# Patient Record
Sex: FEMALE | Race: WHITE | HISPANIC OR LATINO | Employment: OTHER | URBAN - METROPOLITAN AREA
[De-identification: names, ages, dates, MRNs, and addresses within clinical notes are randomized per-mention and may not be internally consistent; named-entity substitution may affect disease eponyms.]

---

## 2023-08-04 LAB
ALANINE AMINOTRANSFERASE (SGPT) (U/L) IN SER/PLAS: 30 U/L (ref 7–45)
ALBUMIN (G/DL) IN SER/PLAS: 4.2 G/DL (ref 3.4–5)
ALKALINE PHOSPHATASE (U/L) IN SER/PLAS: 105 U/L (ref 33–110)
ANION GAP IN SER/PLAS: 16 MMOL/L (ref 10–20)
ASPARTATE AMINOTRANSFERASE (SGOT) (U/L) IN SER/PLAS: 35 U/L (ref 9–39)
BASOPHILS (10*3/UL) IN BLOOD BY AUTOMATED COUNT: 0.02 X10E9/L (ref 0–0.1)
BASOPHILS/100 LEUKOCYTES IN BLOOD BY AUTOMATED COUNT: 0.5 % (ref 0–2)
BILIRUBIN TOTAL (MG/DL) IN SER/PLAS: 0.5 MG/DL (ref 0–1.2)
CALCIUM (MG/DL) IN SER/PLAS: 9.3 MG/DL (ref 8.6–10.6)
CARBAMAZEPINE (UG/ML) IN SER/PLAS: 6.6 UG/ML (ref 4–12)
CARBON DIOXIDE, TOTAL (MMOL/L) IN SER/PLAS: 26 MMOL/L (ref 21–32)
CHLORIDE (MMOL/L) IN SER/PLAS: 100 MMOL/L (ref 98–107)
CHOLESTEROL (MG/DL) IN SER/PLAS: 178 MG/DL (ref 0–199)
CHOLESTEROL IN HDL (MG/DL) IN SER/PLAS: 55.5 MG/DL
CHOLESTEROL/HDL RATIO: 3.2
CREATININE (MG/DL) IN SER/PLAS: 0.65 MG/DL (ref 0.5–1.05)
EOSINOPHILS (10*3/UL) IN BLOOD BY AUTOMATED COUNT: 0.07 X10E9/L (ref 0–0.7)
EOSINOPHILS/100 LEUKOCYTES IN BLOOD BY AUTOMATED COUNT: 1.9 % (ref 0–6)
ERYTHROCYTE DISTRIBUTION WIDTH (RATIO) BY AUTOMATED COUNT: 12 % (ref 11.5–14.5)
ERYTHROCYTE MEAN CORPUSCULAR HEMOGLOBIN CONCENTRATION (G/DL) BY AUTOMATED: 32.6 G/DL (ref 32–36)
ERYTHROCYTE MEAN CORPUSCULAR VOLUME (FL) BY AUTOMATED COUNT: 97 FL (ref 80–100)
ERYTHROCYTES (10*6/UL) IN BLOOD BY AUTOMATED COUNT: 4.77 X10E12/L (ref 4–5.2)
GFR FEMALE: >90 ML/MIN/1.73M2
GLUCOSE (MG/DL) IN SER/PLAS: 100 MG/DL (ref 74–99)
HEMATOCRIT (%) IN BLOOD BY AUTOMATED COUNT: 46.3 % (ref 36–46)
HEMOGLOBIN (G/DL) IN BLOOD: 15.1 G/DL (ref 12–16)
IMMATURE GRANULOCYTES/100 LEUKOCYTES IN BLOOD BY AUTOMATED COUNT: 0.3 % (ref 0–0.9)
LDL: 96 MG/DL (ref 0–99)
LEUKOCYTES (10*3/UL) IN BLOOD BY AUTOMATED COUNT: 3.7 X10E9/L (ref 4.4–11.3)
LYMPHOCYTES (10*3/UL) IN BLOOD BY AUTOMATED COUNT: 1.12 X10E9/L (ref 1.2–4.8)
LYMPHOCYTES/100 LEUKOCYTES IN BLOOD BY AUTOMATED COUNT: 30 % (ref 13–44)
MONOCYTES (10*3/UL) IN BLOOD BY AUTOMATED COUNT: 0.34 X10E9/L (ref 0.1–1)
MONOCYTES/100 LEUKOCYTES IN BLOOD BY AUTOMATED COUNT: 9.1 % (ref 2–10)
NEUTROPHILS (10*3/UL) IN BLOOD BY AUTOMATED COUNT: 2.17 X10E9/L (ref 1.2–7.7)
NEUTROPHILS/100 LEUKOCYTES IN BLOOD BY AUTOMATED COUNT: 58.2 % (ref 40–80)
NRBC (PER 100 WBCS) BY AUTOMATED COUNT: 0 /100 WBC (ref 0–0)
PLATELETS (10*3/UL) IN BLOOD AUTOMATED COUNT: 261 X10E9/L (ref 150–450)
POTASSIUM (MMOL/L) IN SER/PLAS: 4.8 MMOL/L (ref 3.5–5.3)
PROTEIN TOTAL: 7.3 G/DL (ref 6.4–8.2)
SODIUM (MMOL/L) IN SER/PLAS: 137 MMOL/L (ref 136–145)
TRIGLYCERIDE (MG/DL) IN SER/PLAS: 134 MG/DL (ref 0–149)
UREA NITROGEN (MG/DL) IN SER/PLAS: 14 MG/DL (ref 6–23)
VLDL: 27 MG/DL (ref 0–40)

## 2023-10-25 DIAGNOSIS — F43.23 ADJUSTMENT DISORDER WITH MIXED ANXIETY AND DEPRESSED MOOD: ICD-10-CM

## 2023-10-26 ENCOUNTER — TELEPHONE (OUTPATIENT)
Dept: BEHAVIORAL HEALTH | Facility: CLINIC | Age: 58
End: 2023-10-26
Payer: MEDICARE

## 2023-10-26 RX ORDER — CARBAMAZEPINE 100 MG/1
CAPSULE, EXTENDED RELEASE ORAL
Qty: 30 CAPSULE | Refills: 1 | Status: SHIPPED | OUTPATIENT
Start: 2023-10-26 | End: 2023-12-11 | Stop reason: ALTCHOICE

## 2023-10-26 RX ORDER — TALC
3 POWDER (GRAM) TOPICAL NIGHTLY
Qty: 3 TABLET | Refills: 1 | Status: SHIPPED | OUTPATIENT
Start: 2023-10-26 | End: 2023-12-11 | Stop reason: SDUPTHER

## 2023-10-26 RX ORDER — FLUOXETINE HYDROCHLORIDE 20 MG/1
20 CAPSULE ORAL
Qty: 3 CAPSULE | Refills: 1 | Status: SHIPPED | OUTPATIENT
Start: 2023-10-26 | End: 2023-12-11 | Stop reason: SDUPTHER

## 2023-10-26 RX ORDER — CARBAMAZEPINE 200 MG/1
1 CAPSULE, EXTENDED RELEASE ORAL NIGHTLY
Qty: 30 CAPSULE | Refills: 1 | Status: SHIPPED | OUTPATIENT
Start: 2023-10-26 | End: 2023-12-11 | Stop reason: ALTCHOICE

## 2023-10-26 NOTE — PROGRESS NOTES
Pharmacy called to clarify if you meant to only send in 3 pills for melatonin and prozac?Thank you.

## 2023-12-08 ENCOUNTER — APPOINTMENT (OUTPATIENT)
Dept: BEHAVIORAL HEALTH | Facility: CLINIC | Age: 58
End: 2023-12-08
Payer: MEDICARE

## 2023-12-11 ENCOUNTER — TELEMEDICINE (OUTPATIENT)
Dept: BEHAVIORAL HEALTH | Facility: CLINIC | Age: 58
End: 2023-12-11
Payer: MEDICARE

## 2023-12-11 VITALS
RESPIRATION RATE: 17 BRPM | SYSTOLIC BLOOD PRESSURE: 124 MMHG | DIASTOLIC BLOOD PRESSURE: 83 MMHG | BODY MASS INDEX: 36.52 KG/M2 | HEIGHT: 60 IN | WEIGHT: 186 LBS | HEART RATE: 67 BPM

## 2023-12-11 DIAGNOSIS — F31.9 BIPOLAR 1 DISORDER (MULTI): Primary | ICD-10-CM

## 2023-12-11 DIAGNOSIS — G47.9 SLEEP DISTURBANCES: ICD-10-CM

## 2023-12-11 DIAGNOSIS — F71 MODERATE INTELLECTUAL DISABILITY WITH INTELLIGENCE QUOTIENT 35 TO 49: ICD-10-CM

## 2023-12-11 DIAGNOSIS — F43.23 ADJUSTMENT DISORDER WITH MIXED ANXIETY AND DEPRESSED MOOD: ICD-10-CM

## 2023-12-11 DIAGNOSIS — F42.2 MIXED OBSESSIONAL THOUGHTS AND ACTS: ICD-10-CM

## 2023-12-11 PROCEDURE — 99213 OFFICE O/P EST LOW 20 MIN: CPT | Performed by: NURSE PRACTITIONER

## 2023-12-11 RX ORDER — TALC
3 POWDER (GRAM) TOPICAL NIGHTLY
Qty: 30 TABLET | Refills: 5 | Status: SHIPPED | OUTPATIENT
Start: 2023-12-11 | End: 2024-03-04 | Stop reason: SDUPTHER

## 2023-12-11 RX ORDER — FLUOXETINE HYDROCHLORIDE 20 MG/1
20 CAPSULE ORAL DAILY
Qty: 30 CAPSULE | Refills: 5 | Status: SHIPPED | OUTPATIENT
Start: 2023-12-11 | End: 2024-03-04 | Stop reason: SDUPTHER

## 2023-12-11 RX ORDER — CARBAMAZEPINE 100 MG/1
100 CAPSULE, EXTENDED RELEASE ORAL EVERY 12 HOURS
Qty: 60 CAPSULE | Refills: 5 | Status: SHIPPED | OUTPATIENT
Start: 2023-12-11 | End: 2024-03-04 | Stop reason: SDUPTHER

## 2023-12-11 NOTE — PROGRESS NOTES
ASSESSMENT: Ms. Santiago presents unchanged with the last set of medication changes. However, she recently had an polyp removed. Therefore, unknown if it was pain related. Staff will continue to monitor to see if there is any improvement.  Reviewed labs. Low white blood cell count.  Will decrease Equetro next appointment. Therefore, earlier appointment given.  See treatment plan below.   **Plasma concentrations and pharmacologic effects of Equetro 100 MG Oral Capsule Extended Release 12 Hour may be increased by FLUoxetine HCl - 20 MG Oral Capsule. Toxicity (nausea, vomiting, diplopia, blurred vision, ataxia) may occur. Toxic serotonin syndrome may also occur.  **Plasma concentrations and therapeutic effects of atorvastatin may be decreased by strong CY inducers (Equetro 100 MG Oral Capsule Extended Release 12 Hour).     PLAN:                                                                                                                         problems treated   f/u requested to prevent relapse   medications renewed/re-ordered     1. Decrease Equetro (Carbamazepine XR) 100 mg by mouth QAM & 100 mg (from 200 mg) by mouth QHS for Bipolar DO. No clinical indication for mental health use.  2. Continue Fluoxetine (Prozac) take 20 mg by mouth daily for OCD.   3.  Continue Melatonin take 3 mg by mouth at bedtime for sleep.   4. Return To Clinic  3 months or earlier if needed. Call [phone] to reschedule.  5. Risks, benefits, alternatives, off-label uses, and side effects of medications have been discussed with patient/caregiver. There is no report of signs/symptoms consistent with medication-induced impairment in daily functioning. At this time, benefits of medication felt to outweigh potential risks. Will continue to reassess need for psychotropic medication at regular 3 to 6 month intervals.   Guardian, Whitley Martinez      Thank you for seeing me today.  If you have any questions or concerns, do not hesitate  to contact my office.  Arcelia Madlonado     TREATMENT TYPE                                                                                                  counseling and coordination of care addressing signs and symptoms of illness; risks/benefits and side effects of medications; and behavioral approaches to illness.  This note was created using electronic dictation. There may be errors in syntax and meaning. Please contact the office with any questions.   For [facility] residents, [employer] is a 24/7 hotline you can call for assistance [[phone]].   Please call 911 or go to your closest Emergency Room if you feel worse. This includes thoughts of hurting yourself or anyone else, or having other troubles such as hearing voices, seeing visions, or having new and scary thoughts about the people around you.      Provider Impressions     PRESENT FOR APPOINTMENT  Client  Caregiver:    Christina Dash, known since Christina has been 6 (16-17 years)     [facility] moved to November 2020 dt open bed. Previously at [facility].     SUBJECTIVE: 58 yo [place] American SF with a history of bipolar disorder, OCD, deaf, nonverbal, and moderate intellectual disability presenting for medication management.   Client not able to use correct American Sign Language. Therefore, not able to utilize  with the [email or web address] system.      Last seen September 2023.  At that time, no medication changes.  May 2023. At that time, melatonin and fluoxetine were increased due to increased OCD.   November 2022. At that time, no medication changes. In interim, see 2/13/23 chart update, hydroxyzine PRN started for [place] trip. DC'd upon return.  May 31, 2022. At that time, no medication changes.  May 9, 2022. At that time, Melatonin was started.  October 2021. At that time, no medication changes.  September 2021. At that time, Equetro (Carbamazepine XR) was decreased.   March 2021. At that time, no medication changes.  December  "2020. At that time, no medication changes.  May 2020. At that time, no medication changes.  February 2020. At that time, no medication changes.   August 2019. At that time, no medication changes.  November 2018. At that time, no medication changes.  August 2018. Lorazepam PRN DC'd dt not approved by [facility].  May 2018. At that time, Lorazepam PRN started for appts. Has not used in interim.  March 2018 for initial evaluation. At that time, no medication changes.      Info collected by caregiver, as ct is deaf and nonverbal. Debbie stands and Waves.  She uses American sign language to this clinician to say thank you.  Still some inappropriate gestures sexually in public.  Ritualistic stepping.  Will wake up in the night for short.  The time, but able to go back to sleep.  Naps some in the day.   increased touching and smelling items. Taking stairs and kicking each step  9/15/23 Endometrial polyp removed at [facility]. Waiting for results.     Strip her clothes, will open the door and screen. She will look back to see if staff are going to stop her. Usually if staff ignore her while she walks naked down the stairs, she will go put her clothes back on.  Attempts to hit certain peer.      Pulm  Appetite: DASH diet. Low sodium. No PICA reported. Discussed ct on Low Sodium diet, but use of Equetro has possible SE of hyponatremia. Levels remain WNL  She gets \"giddy\", excited to go out in public, like shopping. Staff report while they are out, she is constantly touching their sides.  When she is unhappy or bored: strips off her clothes, wears her clothes inside out, shoes on the wrong feet, acts like she is attempting to AWOL, but will keep looking over her shoulder to see if staff are coming.    Continues to participate.      Some laughing to herself and placing her hands in her pants to smell them. Caregiver reports this has always been.      Wears identifying info on bracelet that she wears when in the community.  Coping: " "coloring, walks, car rides  Psy/SI/HI/aggression  May 21, 2018, Debbie engaged in self-injurious behavior scratching herself for unknown reasons. None since. She has had some stripping at the workshop. She will then go in the bathroom and start washing with a damp cloth.   Reports from workshop stated that Debbie does not communicate with staff, but Feb 2018 was using sign language indicating sex.  May touch/rub others' stomach and start laughing. New in the year 2017.  Hx of Aggression: Physical towards female peer or staff: has picked up items to use to hit peer.   Appears short lasting, no more than 30 minutes: if peer looks injured (holding her head) ct walks over and holds her arm out for peer to hit her back.  Will \"look off at something and start laughing.\" No seizure hx. Episodes last a few seconds and occur multiple times a day. Staff report that she likes to look out the window and will see a bird or something. Staff reports that it does not appear to be from internal stimulation.  No hysterical laughing noted when alone. No crying without cause  Collects: Kleenex and paper towels: uses and keeps on her dresser. Caregiver will not discard, but holds a trash can and allow client to throw away. Does not like to get rid of old clothes. She likes to be asked and will give thumbs up to discard. Staff report her room is \"spotless\".   OCD: Debbie will take several steps forward, then a couple steps backwards. She will follow this pattern even on steps. May be mildly bothered by interruption in routine, but does not interfere with the rest of her day.  No MH hospitalizations.        Prior to COVID: Daily schedule: [facility] M-F 9-3:30.   Self-sufficient in all ADL's likes to look \"pretty.\" Wears necklaces, bracelets & matching hair bows.  Med Physicians:  PCP: Dr Sheriff. Has routine appt. no medication changes.   Podiatrist: Dr Hailey Dickinson. Routine appts.  Dietician: [facility]: " "Deborah Ortega. Attends Q 3-6 months.   GYN: Cari Bee NP approx Sept 2017.  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  Menopausal-   ALLERGIES: NKDA     MEDICAL REVIEW OF SYSTEMS:  GEN: denies fever, chills, night sweats  HEENT: denies changes in vision, eye pain, no symptoms of upper respiratory infection. Deaf in both ears  CARDIO: denies chest pain and palpitations   RESP: denies shortness of breath  GI: denies nausea/vomiting/constipation/diarrhea, or blood in the stool  : denies burning/frequency/urgency, or bloody urine  NEURO: denies tremor, dizziness, numbness or tingling  MSK: denies muscle spasms or stiffness  DERM: denies new onset of rash     Med SE: None noted or reported  CHANGE IN TREATMENT: none  COMPLIANCE: good     MMS:  ORIENTATION   alert  ambulatory  cooperative   dysmorphic features     BEHAVIOR:  enters easily  eye contact fair   gait normal  reciprocal interaction  non verbal     MEMORY:  unable to assess     SENSORY :  glasses  Deaf     COMMUNICATION:  No ASL     AFFECT:  normal/full     MOOD: When asked how she feels, rotated her finger in circles on the \"Feelings Chart\"      THOUGHT PROCESS:  concrete   perseverative      THOUGHT Content:  obsessive     CONCENTRATION  normal     FUND OF KNOWLEDGE :  moderate disability     JUDGEMENT: posed situations     EPS None noted or reported  AIMS negative 9/18/23     LABS REVIEWED:  August 2023 in cart  June 2022 in chart  Carbamazepine 1.5 ug/mL (0.8-2.4). While on 100/200 mg  Feb 2021: reviewed:  White Blood Cell Count 3.7 x10E9/L L 4.4 - 11.3      EKG none available      Chief Complaint     An interactive audio and video telecommunication system which permits real time communications between the patient (at the originating site) and provider (at the distant site) was utilized to provide this telehealth service.    Verbal consent was requested and obtained from ADIS VALDES on this date, 09/18/2023 08:45 AM , for a telehealth " visit.   Evaluation of medication for bipolar disorder, OCD                 History of Present IllnessFamily history: - Psychiatric diagnosis: denies primary relatives with serious mental illness (SA) and/or substance use disorders  Parents: both are , prior to ct residing at   Siblings: 3 sisters & 4 brothers, none are in contact with ct  Personal history: unknown  Birth: unknown  Development: unknown  School: unknown  Occupations: [employer]  Sexual: unknown  Abuse: unknown  Orientation: unknown  Marriage/partner: none  Habits: likes routine  Alcohol: none  Tobacco: none  Drugs: none  Prescribed: none  Recreational: LEAP, Softball, line dancing, Volleyball,      Past psychiatric  Previously seen at [facility] from  until 3/23/18.   Had lived with her parents until they passed. She had a short stay in respite until she was connected with provider [facility]. Has resided at  since , in the [facility]. Moved into [facility] 2020. Services still through [facility]. Resides with 3 female peers, ct has her own room.      There are no spiritual/cultural practices/values/needs that are important to know   Initial Fall Risk Screening:   ADIS has not fallen in the last 6 months. The patient is not using an assistive device.    Tobacco Screening: ADIS does not use tobacco.   Blood Pressure monitoring   Blood Pressure Uncontrolled, Systolic >= 140mm Hg   Blood Pressure Controlled, Diastolic 80-89mm Hg   Domestic Violence Screen: Does not feel threatened or abused physically, emotionally or sexually. Do you feel UNSAFE?   The patient feels safe in the home.   Depression/Suicide Screening:   During the past 2 weeks, the patient has not felt down, depressed or hopeless.    During the past 2 weeks, the patient has not felt little interest or pleasure in doing things.    She does not have a risk of suicide.    She has not had thoughts of harming others.     Single alcohol screening question: Patient Declined/Screening not indicated.   Single substance abuse screening question: Patient Declined/Screening not indicated.   Procedure or Sedation Areas:   patient has not had alcohol, recreational drugs, or prescription drugs for non-medical reasons this morning.   Nutrition Screening:   In the past month, there was not a day when I or anyone in my family went hungry because there was not enough food.   Patient Education: The patient or the person with them need(s) extra help because of problems with: learning speaking   The patient is not comfortable filling out medical forms.   Key Learner(s) are identified by the patient as person(s) most likely to participate in providing care, such as managing medications or taking them to doctors' appointments. Relation: Legal Guardian.      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8) (Z72.89)     The patient's household members: [employer]. Has resided at  since 2013, in the [facility].   Employment History: workshop.         Guardian: Whitley Perdomo=[employer].   Living Situation: [facility] 2013 through [facility] services.   Relationship Status: single.   Legal History: none.    history: none.      Allergies  Medication    · No Known  Drug Allergies   Recorded By: Arcelia Galvan; 3/23/2018 10:43:23 AM   Scores and Scales        Facial and Oral Movements:   Muscles of Facial Expression eg. movements of forehead, eyebrows, periorbital area, cheeks; include frowning, blinking, smiling, grimacing: None normal (0).   Lips and Perioral Area eg. puckering, pouting, smacking: None normal (0).   Jaw eg. biting, clenching, chewing, mouth opening, lateral movement: None normal (0).   Tongue. Rate only increases in movement both in and out of mouth, NOT inability to sustain movement None normal (0).   Extremity Movements:   Upper (arms, wrists, hands, fingers). Include chronic movements ( ie, rapid, objectively purposeless, irregular, spontaneous); athetoid movements (ie, slow, irregular, complex, serpentine). DO NOT include tremor (ie, repetitive, regular, rythmic): None normal (0).   Lower (legs, knees, ankles, toes) eg, lateral knee movement, foot tapping, heel dropping, foot squirming, inversion and eversion of foot: None normal (0).   Trunk Movements:   Neck, shoulders, hips. eg, rocking, twisting, squirming, pelvic gyrations: None normal (0).      Overall Severity:   Severity of abnormal movements: None normal (0).   Incapacitation due to abnormal movements: None normal (0).   Patient's awareness of abnormal movements (rate only patient's report): No awareness (0).   Dental Status:   Current porblems with teeth and/or dentures: No.   Does patient usually wear dentures: No.      5/18/2023 clients is deaf and mute. Therefore, not able to complete this self questionnaire. However, caregiver of 16+ years reports no overt signs of depression.         PHQ-9 Depression Scale:   Total Score: 0/27

## 2024-03-04 ENCOUNTER — TELEPHONE (OUTPATIENT)
Dept: BEHAVIORAL HEALTH | Facility: CLINIC | Age: 59
End: 2024-03-04

## 2024-03-04 ENCOUNTER — TELEMEDICINE (OUTPATIENT)
Dept: BEHAVIORAL HEALTH | Facility: CLINIC | Age: 59
End: 2024-03-04
Payer: MEDICARE

## 2024-03-04 VITALS
WEIGHT: 178.5 LBS | DIASTOLIC BLOOD PRESSURE: 60 MMHG | BODY MASS INDEX: 34.86 KG/M2 | HEART RATE: 64 BPM | SYSTOLIC BLOOD PRESSURE: 131 MMHG

## 2024-03-04 DIAGNOSIS — G47.9 SLEEP DISTURBANCES: ICD-10-CM

## 2024-03-04 DIAGNOSIS — F42.2 MIXED OBSESSIONAL THOUGHTS AND ACTS: ICD-10-CM

## 2024-03-04 DIAGNOSIS — F31.9 BIPOLAR 1 DISORDER (MULTI): Primary | ICD-10-CM

## 2024-03-04 PROCEDURE — 1036F TOBACCO NON-USER: CPT | Performed by: NURSE PRACTITIONER

## 2024-03-04 PROCEDURE — 99213 OFFICE O/P EST LOW 20 MIN: CPT | Performed by: NURSE PRACTITIONER

## 2024-03-04 RX ORDER — CARBAMAZEPINE 100 MG/1
CAPSULE, EXTENDED RELEASE ORAL
Qty: 14 CAPSULE | Refills: 0 | Status: SHIPPED | OUTPATIENT
Start: 2024-03-04 | End: 2024-05-20 | Stop reason: ALTCHOICE

## 2024-03-04 RX ORDER — PANTOPRAZOLE SODIUM 20 MG/1
TABLET, DELAYED RELEASE ORAL
COMMUNITY
Start: 2024-02-16

## 2024-03-04 RX ORDER — AMLODIPINE BESYLATE 5 MG/1
TABLET ORAL
COMMUNITY
Start: 2024-02-16

## 2024-03-04 RX ORDER — FLUOXETINE HYDROCHLORIDE 20 MG/1
20 CAPSULE ORAL DAILY
Qty: 30 CAPSULE | Refills: 3 | Status: SHIPPED | OUTPATIENT
Start: 2024-03-04 | End: 2024-05-20 | Stop reason: ALTCHOICE

## 2024-03-04 RX ORDER — TALC
3 POWDER (GRAM) TOPICAL NIGHTLY
Qty: 30 TABLET | Refills: 3 | Status: SHIPPED | OUTPATIENT
Start: 2024-03-04 | End: 2024-05-20 | Stop reason: SDUPTHER

## 2024-03-04 NOTE — PROGRESS NOTES
ASSESSMENT: Ms. Santiago presents unchanged with the last set of medication changes. See treatment plan below.      PLAN:                                                                                                                         problems treated   f/u requested to prevent relapse   medications renewed/re-ordered     1.  Taper off: Equetro (Carbamazepine XR) 100 mg by mouth QAM x 14 days, then discontinue.  Discontinue bedtime dose now.  2. Continue Fluoxetine (Prozac) take 20 mg by mouth daily for OCD.   3.  Continue Melatonin take 3 mg by mouth at bedtime for sleep.   4. Return To Clinic  2-3 months or earlier if needed. Call [phone] to reschedule.  5. Risks, benefits, alternatives, off-label uses, and side effects of medications have been discussed with patient/caregiver. There is no report of signs/symptoms consistent with medication-induced impairment in daily functioning. At this time, benefits of medication felt to outweigh potential risks. Will continue to reassess need for psychotropic medication at regular 3 to 6 month intervals.   Guardian, Whitley Martinez      Thank you for seeing me today.  If you have any questions or concerns, do not hesitate to contact my office.  Arcelia Maldonado     TREATMENT TYPE                                                                                                  counseling and coordination of care addressing signs and symptoms of illness; risks/benefits and side effects of medications; and behavioral approaches to illness.  This note was created using electronic dictation. There may be errors in syntax and meaning. Please contact the office with any questions.   For [facility] residents, [employer] is a 24/7 hotline you can call for assistance [[phone]].   Please call 911 or go to your closest Emergency Room if you feel worse. This includes thoughts of hurting yourself or anyone else, or having other troubles such as hearing voices,  seeing visions, or having new and scary thoughts about the people around you.      Provider Impressions     PRESENT FOR APPOINTMENT  Client  Caregiver:    Christina Dash, known since Christina has been 6 (16-17 years)     [facility] moved to November 2020 dt open bed. Previously at [facility].     SUBJECTIVE: 57 yo [place] American SF with a history of bipolar disorder, OCD, deaf, nonverbal, and moderate intellectual disability presenting for medication management.   Client not able to use correct American Sign Language. Therefore, not able to utilize  with the [employer] system.      Last seen December 2023.  At that time, it EQuetro was decreased due to low white blood cell count.    September 2023.  At that time, no medication changes.  May 2023. At that time, melatonin and fluoxetine were increased due to increased OCD.   November 2022. At that time, no medication changes. In interim, see 2/13/23 chart update, hydroxyzine PRN started for [place] trip. DC'd upon return.  May 31, 2022. At that time, no medication changes.  May 9, 2022. At that time, Melatonin was started.  October 2021. At that time, no medication changes.  September 2021. At that time, Equetro (Carbamazepine XR) was decreased.   March 2021. At that time, no medication changes.  December 2020. At that time, no medication changes.  May 2020. At that time, no medication changes.  February 2020. At that time, no medication changes.   August 2019. At that time, no medication changes.  November 2018. At that time, no medication changes.  August 2018. Lorazepam PRN DC'd dt not approved by [facility].  May 2018. At that time, Lorazepam PRN started for appts. Has not used in interim.  March 2018 for initial evaluation. At that time, no medication changes.      Info collected by caregiver, as ct is deaf and nonverbal. Debbie stands and Waves.  She uses American sign language to this clinician to say thank you.  Still some inappropriate gestures  "sexually in public.  Ritualistic stepping.  Will wake up in the night for short.  The time, but able to go back to sleep.  Naps some in the day.   increased touching and smelling items. Taking stairs and kicking each step  9/15/23 Endometrial polyp removed at [facility]. Waiting for results.     Strip her clothes, will open the door and screen. She will look back to see if staff are going to stop her. Usually if staff ignore her while she walks naked down the stairs, she will go put her clothes back on.  Attempts to hit certain peer.      Pulm  Appetite: DASH diet. Low sodium. No PICA reported. Discussed ct on Low Sodium diet, but use of Equetro has possible SE of hyponatremia. Levels remain WNL  She gets \"giddy\", excited to go out in public, like shopping. Staff report while they are out, she is constantly touching their sides.  When she is unhappy or bored: strips off her clothes, wears her clothes inside out, shoes on the wrong feet, acts like she is attempting to AWOL, but will keep looking over her shoulder to see if staff are coming.    Continues to participate.      Some laughing to herself and placing her hands in her pants to smell them. Caregiver reports this has always been.      Wears identifying info on bracelet that she wears when in the community.  Coping: coloring, walks, car rides  Psy/SI/HI/aggression  May 21, 2018, Debbie engaged in self-injurious behavior scratching herself for unknown reasons. None since. She has had some stripping at the workshop. She will then go in the bathroom and start washing with a damp cloth.   Reports from workshop stated that Debbie does not communicate with staff, but Feb 2018 was using sign language indicating sex.  May touch/rub others' stomach and start laughing. New in the year 2017.  Hx of Aggression: Physical towards female peer or staff: has picked up items to use to hit peer.   Appears short lasting, no more than 30 minutes: if peer looks injured (holding her " "head) ct walks over and holds her arm out for peer to hit her back.  Will \"look off at something and start laughing.\" No seizure hx. Episodes last a few seconds and occur multiple times a day. Staff report that she likes to look out the window and will see a bird or something. Staff reports that it does not appear to be from internal stimulation.  No hysterical laughing noted when alone. No crying without cause  Collects: Kleenex and paper towels: uses and keeps on her dresser. Caregiver will not discard, but holds a trash can and allow client to throw away. Does not like to get rid of old clothes. She likes to be asked and will give thumbs up to discard. Staff report her room is \"spotless\".   OCD: Debbie will take several steps forward, then a couple steps backwards. She will follow this pattern even on steps. May be mildly bothered by interruption in routine, but does not interfere with the rest of her day.  No MH hospitalizations.        Prior to COVID: Daily schedule: [employer] M-F 9-3:30.   Self-sufficient in all ADL's likes to look \"pretty.\" Wears necklaces, bracelets & matching hair bows.  Med Physicians:  PCP: Dr Sheriff. Has routine appt. no medication changes.   Podiatrist: Dr Hailey Dickinson. Routine appts.  Dietician: [facility]: Deborah Ortega. Attends Q 3-6 months.   GYN: Cari Bee NP approx Sept 2017.  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  Menopausal-   ALLERGIES: NKDA     MEDICAL REVIEW OF SYSTEMS:  GEN: denies fever, chills, night sweats  HEENT: denies changes in vision, eye pain, no symptoms of upper respiratory infection. Deaf in both ears  CARDIO: denies chest pain and palpitations   RESP: denies shortness of breath  GI: denies nausea/vomiting/constipation/diarrhea, or blood in the stool  : denies burning/frequency/urgency, or bloody urine  NEURO: denies tremor, dizziness, numbness or tingling  MSK: denies muscle spasms or stiffness  DERM: " "denies new onset of rash     Med SE: None noted or reported    COMPLIANCE: good     MMS:  ORIENTATION   alert  ambulatory  cooperative   dysmorphic features     BEHAVIOR:  enters easily  eye contact fair   gait normal  reciprocal interaction  non verbal     MEMORY:  unable to assess     SENSORY :  glasses  Deaf     COMMUNICATION:  No ASL     AFFECT:  normal/full     MOOD: When asked how she feels, rotated her finger in circles on the \"Feelings Chart\"      THOUGHT PROCESS:  concrete   perseverative      THOUGHT Content:  obsessive     CONCENTRATION  normal     FUND OF KNOWLEDGE :  moderate disability     JUDGEMENT: posed situations     EPS None noted or reported  AIMS negative 23     LABS REVIEWED:  2023 in cart  2022 in chart  Carbamazepine 1.5 ug/mL (0.8-2.4). While on 100/200 mg  2021: reviewed:  White Blood Cell Count 3.7 x10E9/L L 4.4 - 11.3      EKG none available      Chief Complaint     An interactive audio and video telecommunication system which permits real time communications between the patient (at the originating site) and provider (at the distant site) was utilized to provide this telehealth service.    Verbal consent was requested and obtained from ADIS VALDES on this date for a telehealth visit.   Evaluation of medication for bipolar disorder, OCD                 History of Present IllnessFamily history: - Psychiatric diagnosis: denies primary relatives with serious mental illness (SA) and/or substance use disorders  Parents: both are , prior to ct residing at   Siblings: 3 sisters & 4 brothers, none are in contact with ct  Personal history: unknown  Birth: unknown  Development: unknown  School: unknown  Occupations: [employer]  Sexual: unknown  Abuse: unknown  Orientation: unknown  Marriage/partner: none  Habits: likes routine  Alcohol: none  Tobacco: none  Drugs: none  Prescribed: none  Recreational: LEAP, Softball, line dancing, " Dilia,      Past psychiatric  Previously seen at [facility] from 2013 until 3/23/18.   Had lived with her parents until they passed. She had a short stay in respite until she was connected with provider [facility]. Has resided at  since 2013, in the [facility]. Moved into [facility] November 2020. Services still through [facility]. Resides with 3 female peers, ct has her own room.      There are no spiritual/cultural practices/values/needs that are important to know   Initial Fall Risk Screening:   ADIS has not fallen in the last 6 months. The patient is not using an assistive device.    Tobacco Screening: ADIS does not use tobacco.   Blood Pressure monitoring   Blood Pressure Uncontrolled, Systolic >= 140mm Hg   Blood Pressure Controlled, Diastolic 80-89mm Hg   Domestic Violence Screen: Does not feel threatened or abused physically, emotionally or sexually. Do you feel UNSAFE?   The patient feels safe in the home.   Depression/Suicide Screening:   During the past 2 weeks, the patient has not felt down, depressed or hopeless.    During the past 2 weeks, the patient has not felt little interest or pleasure in doing things.    She does not have a risk of suicide.    She has not had thoughts of harming others.    Single alcohol screening question: Patient Declined/Screening not indicated.   Single substance abuse screening question: Patient Declined/Screening not indicated.   Procedure or Sedation Areas:   patient has not had alcohol, recreational drugs, or prescription drugs for non-medical reasons this morning.   Nutrition Screening:   In the past month, there was not a day when I or anyone in my family went hungry because there was not enough food.   Patient Education: The patient or the person with them need(s) extra help because of problems with: learning speaking   The patient is not comfortable filling out medical forms.   Key Learner(s) are identified by the patient as person(s) most  likely to participate in providing care, such as managing medications or taking them to doctors' appointments. Relation: Legal Guardian.      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8) (Z72.89)     The patient's household members: [employer]. Has resided at  since 2013, in the [facility].   Employment History: workshop.         Guardian: Whitley Perdomo=[employer].   Living Situation: [facility] through [facility].   Relationship Status: single.   Legal History: none.    history: none.      Allergies  Medication    · No Known Drug Allergies   Recorded By: Arcelia Galvan; 3/23/2018 10:43:23 AM   Scores and Scales        Facial and Oral Movements:   Muscles of Facial Expression eg. movements of forehead, eyebrows, periorbital area, cheeks; include frowning, blinking, smiling, grimacing: None normal (0).   Lips and Perioral Area eg. puckering, pouting, smacking: None normal (0).   Jaw eg. biting, clenching, chewing, mouth opening, lateral movement: None normal (0).   Tongue. Rate only increases in movement both in and out of mouth, NOT inability to sustain movement None normal (0).   Extremity Movements:   Upper (arms, wrists, hands, fingers). Include chronic movements ( ie, rapid, objectively purposeless,  irregular, spontaneous); athetoid movements (ie, slow, irregular, complex, serpentine). DO NOT include tremor (ie, repetitive, regular, rythmic): None normal (0).   Lower (legs, knees, ankles, toes) eg, lateral knee movement, foot tapping, heel dropping, foot squirming, inversion and eversion of foot: None normal (0).   Trunk Movements:   Neck, shoulders, hips. eg, rocking, twisting, squirming, pelvic gyrations: None normal (0).      Overall Severity:   Severity of abnormal movements: None normal (0).   Incapacitation due to abnormal movements: None normal (0).   Patient's awareness of abnormal movements (rate only patient's report): No awareness (0).   Dental Status:   Current porblems with teeth and/or dentures: No.   Does patient usually wear dentures: No.      5/18/2023 clients is deaf and mute. Therefore, not able to complete this self questionnaire. However, caregiver of 16+ years reports no overt signs of depression.         PHQ-9 Depression Scale:   Total Score: 0/27

## 2024-03-04 NOTE — PATIENT INSTRUCTIONS
ASSESSMENT: Ms. Santiago presents unchanged with the last set of medication changes. See treatment plan below.      PLAN:                                                                                                                         problems treated   f/u requested to prevent relapse   medications renewed/re-ordered     1.  Taper off: Equetro (Carbamazepine XR) 100 mg by mouth QAM x 14 days, then discontinue.  Discontinue bedtime dose now.  2. Continue Fluoxetine (Prozac) take 20 mg by mouth daily for OCD.   3.  Continue Melatonin take 3 mg by mouth at bedtime for sleep.   4. Return To Clinic  2-3 months or earlier if needed. Call [phone] to reschedule.  5. Risks, benefits, alternatives, off-label uses, and side effects of medications have been discussed with patient/caregiver. There is no report of signs/symptoms consistent with medication-induced impairment in daily functioning. At this time, benefits of medication felt to outweigh potential risks. Will continue to reassess need for psychotropic medication at regular 3 to 6 month intervals.   Guardian, Whitley Martinez      Thank you for seeing me today.  If you have any questions or concerns, do not hesitate to contact my office.  Arcelia Maldonado     TREATMENT TYPE                                                                                                  counseling and coordination of care addressing signs and symptoms of illness; risks/benefits and side effects of medications; and behavioral approaches to illness.  This note was created using electronic dictation. There may be errors in syntax and meaning. Please contact the office with any questions.   For [facility] residents, [facility] is a 24/7 hotline you can call for assistance [[phone]].   Please call 911 or go to your closest Emergency Room if you feel worse. This includes thoughts of hurting yourself or anyone else, or having other troubles such as hearing voices,  seeing visions, or having new and scary thoughts about the people around you.

## 2024-05-20 ENCOUNTER — TELEMEDICINE (OUTPATIENT)
Dept: BEHAVIORAL HEALTH | Facility: CLINIC | Age: 59
End: 2024-05-20
Payer: MEDICARE

## 2024-05-20 VITALS
HEART RATE: 61 BPM | BODY MASS INDEX: 33.98 KG/M2 | WEIGHT: 174 LBS | DIASTOLIC BLOOD PRESSURE: 77 MMHG | RESPIRATION RATE: 18 BRPM | SYSTOLIC BLOOD PRESSURE: 127 MMHG

## 2024-05-20 DIAGNOSIS — G47.9 SLEEP DISTURBANCES: ICD-10-CM

## 2024-05-20 DIAGNOSIS — F42.2 MIXED OBSESSIONAL THOUGHTS AND ACTS: Primary | ICD-10-CM

## 2024-05-20 PROCEDURE — 1036F TOBACCO NON-USER: CPT | Performed by: NURSE PRACTITIONER

## 2024-05-20 PROCEDURE — 99213 OFFICE O/P EST LOW 20 MIN: CPT | Performed by: NURSE PRACTITIONER

## 2024-05-20 RX ORDER — FLUOXETINE HYDROCHLORIDE 40 MG/1
40 CAPSULE ORAL DAILY
Qty: 30 CAPSULE | Refills: 3 | Status: SHIPPED | OUTPATIENT
Start: 2024-05-20 | End: 2024-09-17

## 2024-05-20 RX ORDER — TALC
3 POWDER (GRAM) TOPICAL NIGHTLY
Qty: 30 TABLET | Refills: 3 | Status: SHIPPED | OUTPATIENT
Start: 2024-05-20

## 2024-05-20 NOTE — PATIENT INSTRUCTIONS
ASSESSMENT: Ms. Santiago presents with an increase in invasive OCD behaviors.  Monitor if adjustment in medications improve sleep onset.  See treatment plan below.      PLAN:                                                                                                                         problems treated   f/u requested to prevent relapse   medications renewed/re-ordered     1. Increase Fluoxetine (Prozac) 40 mg (from 20 mg) by mouth daily for OCD.   2.  Continue Melatonin take 3 mg by mouth at bedtime for sleep.   3. Risks, benefits, alternatives, off-label uses, and side effects of medications have been discussed with patient/caregiver. There is no report of signs/symptoms consistent with medication-induced impairment in daily functioning. At this time, benefits of medication felt to outweigh potential risks. Will continue to reassess need for psychotropic medication at regular 3 to 6 month intervals.   Guardian, Whitley Martinez   4. Return To Clinic  3 months or earlier if needed. Call [phone] to reschedule.  Thank you for seeing me today.  If you have any questions or concerns, do not hesitate to contact my office.  Arcelia Maldonado

## 2024-05-20 NOTE — PROGRESS NOTES
ASSESSMENT: Ms. Santiago presents with an increase in invasive OCD behaviors.  Monitor if adjustment in medications improved sleep onset.  See treatment plan below.      PLAN:                                                                                                                         problems treated   f/u requested to prevent relapse   medications renewed/re-ordered     1. Increase Fluoxetine (Prozac) 40 mg (from 20 mg) by mouth daily for OCD.   2.  Continue Melatonin take 3 mg by mouth at bedtime for sleep.   3. Risks, benefits, alternatives, off-label uses, and side effects of medications have been discussed with patient/caregiver. There is no report of signs/symptoms consistent with medication-induced impairment in daily functioning. At this time, benefits of medication felt to outweigh potential risks. Will continue to reassess need for psychotropic medication at regular 3 to 6 month intervals.   Guardian, Whitley Juan   4. Return To Clinic  3 months or earlier if needed. Call [phone] to reschedule.  Thank you for seeing me today.  If you have any questions or concerns, do not hesitate to contact my office.  Arcelia Maldonado     TREATMENT TYPE                                                                                                  counseling and coordination of care addressing signs and symptoms of illness; risks/benefits and side effects of medications; and behavioral approaches to illness.  This note was created using electronic dictation. There may be errors in syntax and meaning. Please contact the office with any questions.   For [facility] residents, [employer] is a 24/7 hotline you can call for assistance [[phone]].   Please call 911 or go to your closest Emergency Room if you feel worse. This includes thoughts of hurting yourself or anyone else, or having other troubles such as hearing voices, seeing visions, or having new and scary thoughts about the people  around you.      Provider Impressions     PRESENT FOR APPOINTMENT  Client  Caregiver:    Christina Dash, known since Christina has been 6 (16-17 years)     [facility] moved to November 2020 dt open bed. Previously at [facility].     SUBJECTIVE: 57 yo Lithuanian American SF with a history of bipolar disorder, OCD, deaf, nonverbal, and moderate intellectual disability presenting for medication management.   Client not able to use correct American Sign Language. Therefore, not able to utilize  with the [employer] system.      Last seen March 2023. At that time, Equetro (Carbamazepine XR)was Dc'd.  December 2023.  At that time, it EQuetro was decreased due to low white blood cell count.    September 2023.  At that time, no medication changes.  May 2023. At that time, melatonin and fluoxetine were increased due to increased OCD.   November 2022. At that time, no medication changes. In interim, see 2/13/23 chart update, hydroxyzine PRN started for [place] trip. DC'd upon return.  May 31, 2022. At that time, no medication changes.  May 9, 2022. At that time, Melatonin was started.  October 2021. At that time, no medication changes.  September 2021. At that time, Equetro (Carbamazepine XR) was decreased.   March 2021. At that time, no medication changes.  December 2020. At that time, no medication changes.  May 2020. At that time, no medication changes.  February 2020. At that time, no medication changes.   August 2019. At that time, no medication changes.  November 2018. At that time, no medication changes.  August 2018. Lorazepam PRN DC'd dt not approved by [facility].  May 2018. At that time, Lorazepam PRN started for appts. Has not used in interim.  March 2018 for initial evaluation. At that time, no medication changes.      Info collected by caregiver, as ct is deaf and nonverbal. Debbie stands and Waves.  She uses American sign language to this clinician to say thank you.  Still some inappropriate gestures  "sexually in public.  Ritualistic stepping.  Will wake up in the night for short.  The time, but able to go back to sleep.  Naps some in the day.   increased touching and smelling items. Taking stairs and kicking each step  9/15/23 Endometrial polyp removed at [facility]. Waiting for results.     Strip her clothes, will open the door and screen. She will look back to see if staff are going to stop her. Usually if staff ignore her while she walks naked down the stairs, she will go put her clothes back on.  Attempts to hit certain peer.      Pulm  Appetite: DASH diet. Low sodium. No PICA reported. Discussed ct on Low Sodium diet, but use of Equetro has possible SE of hyponatremia. Levels remain WNL  She gets \"giddy\", excited to go out in public, like shopping. Staff report while they are out, she is constantly touching their sides.  When she is unhappy or bored: strips off her clothes, wears her clothes inside out, shoes on the wrong feet, acts like she is attempting to AWOL, but will keep looking over her shoulder to see if staff are coming.    Continues to participate.      Some laughing to herself and placing her hands in her pants to smell them. Caregiver reports this has always been.      Wears identifying info on bracelet that she wears when in the community.  Coping: coloring, walks, car rides  Psy/SI/HI/aggression  May 21, 2018, Debbie engaged in self-injurious behavior scratching herself for unknown reasons. None since. She has had some stripping at the workshop. She will then go in the bathroom and start washing with a damp cloth.   Reports from workshop stated that Debbie does not communicate with staff, but Feb 2018 was using sign language indicating sex.  May touch/rub others' stomach and start laughing. New in the year 2017.  Hx of Aggression: Physical towards female peer or staff: has picked up items to use to hit peer.   Appears short lasting, no more than 30 minutes: if peer looks injured (holding her " "head) ct walks over and holds her arm out for peer to hit her back.  Will \"look off at something and start laughing.\" No seizure hx. Episodes last a few seconds and occur multiple times a day. Staff report that she likes to look out the window and will see a bird or something. Staff reports that it does not appear to be from internal stimulation.  No hysterical laughing noted when alone. No crying without cause  Collects: Kleenex and paper towels: uses and keeps on her dresser. Caregiver will not discard, but holds a trash can and allow client to throw away. Does not like to get rid of old clothes. She likes to be asked and will give thumbs up to discard. Staff report her room is \"spotless\".   OCD: Debbie will take several steps forward, then a couple steps backwards. She will follow this pattern even on steps. May be mildly bothered by interruption in routine, but does not interfere with the rest of her day.  No MH hospitalizations.        Prior to COVID: Daily schedule: [employer] M-F 9-3:30.   Self-sufficient in all ADL's likes to look \"pretty.\" Wears necklaces, bracelets & matching hair bows.  Med Physicians:  PCP: Dr Sheriff. Has routine appt. no medication changes.   Podiatrist: Dr Hailey Dickinson. Routine appts.  Dietician: [facility]: Deborah Ortega. Attends Q 3-6 months.   GYN: Cari Bee NP approx Sept 2017.  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  Menopausal-   ALLERGIES: NKDA     MEDICAL REVIEW OF SYSTEMS:  GEN: denies fever, chills, night sweats  HEENT: denies changes in vision, eye pain, no symptoms of upper respiratory infection. Deaf in both ears  CARDIO: denies chest pain and palpitations   RESP: denies shortness of breath  GI: denies nausea/vomiting/constipation/diarrhea, or blood in the stool  : denies burning/frequency/urgency, or bloody urine  NEURO: denies tremor, dizziness, numbness or tingling  MSK: denies muscle spasms or stiffness  DERM: " "denies new onset of rash     Med SE: None noted or reported    COMPLIANCE: good     MMS:  ORIENTATION   alert  ambulatory  cooperative   dysmorphic features     BEHAVIOR:  enters easily  eye contact fair   gait normal  reciprocal interaction  non verbal     MEMORY:  unable to assess     SENSORY :  glasses  Deaf     COMMUNICATION:  No ASL     AFFECT:  normal/full     MOOD: When asked how she feels, rotated her finger in circles on the \"Feelings Chart\"      THOUGHT PROCESS:  concrete   perseverative      THOUGHT Content:  obsessive     CONCENTRATION  normal     FUND OF KNOWLEDGE :  moderate disability     JUDGEMENT: posed situations     EPS None noted or reported  AIMS negative      LABS REVIEWED:  2023 in cart  2022 in chart  Carbamazepine 1.5 ug/mL (0.8-2.4). While on 100/200 mg  2021: reviewed:  White Blood Cell Count 3.7 x10E9/L L 4.4 - 11.3      EKG none available      Chief Complaint     An interactive audio and video telecommunication system which permits real time communications between the patient (at the originating site) and provider (at the distant site) was utilized to provide this telehealth service.    Verbal consent was requested and obtained from ADIS VALDES on this date for a telehealth visit.   Evaluation of medication for bipolar disorder, OCD                 History of Present IllnessFamily history: - Psychiatric diagnosis: denies primary relatives with serious mental illness (SA) and/or substance use disorders  Parents: both are , prior to ct residing at   Siblings: 3 sisters & 4 brothers, none are in contact with ct  Personal history: unknown  Birth: unknown  Development: unknown  School: unknown  Occupations: [employer]  Sexual: unknown  Abuse: unknown  Orientation: unknown  Marriage/partner: none  Habits: likes routine  Alcohol: none  Tobacco: none  Drugs: none  Prescribed: none  Recreational: LEAP, Softball, line dancing, Volleyball,    "   Past psychiatric  Previously seen at [facility] from 2013 until 3/23/18.   Had lived with her parents until they passed. She had a short stay in respite until she was connected with provider [facility]. Has resided at  since 2013, in the [facility]. Moved into [facility] November 2020. Services still through [facility]. Resides with 3 female peers, ct has her own room.      There are no spiritual/cultural practices/values/needs that are important to know   Initial Fall Risk Screening:   ADIS has not fallen in the last 6 months. The patient is not using an assistive device.    Tobacco Screening: ADIS does not use tobacco.   Blood Pressure monitoring   Blood Pressure Uncontrolled, Systolic >= 140mm Hg   Blood Pressure Controlled, Diastolic 80-89mm Hg   Domestic Violence Screen: Does not feel threatened or abused physically, emotionally or sexually. Do you feel UNSAFE?   The patient feels safe in the home.   Depression/Suicide Screening:   During the past 2 weeks, the patient has not felt down, depressed or hopeless.    During the past 2 weeks, the patient has not felt little interest or pleasure in doing things.    She does not have a risk of suicide.    She has not had thoughts of harming others.    Single alcohol screening question: Patient Declined/Screening not indicated.   Single substance abuse screening question: Patient Declined/Screening not indicated.   Procedure or Sedation Areas:   patient has not had alcohol, recreational drugs, or prescription drugs for non-medical reasons this morning.   Nutrition Screening:   In the past month, there was not a day when I or anyone in my family went hungry because there was not enough food.   Patient Education: The patient or the person with them need(s) extra help because of problems with: learning speaking   The patient is not comfortable filling out medical forms.   Key Learner(s) are identified by the patient as person(s) most likely to  participate in providing care, such as managing medications or taking them to doctors' appointments. Relation: Legal Guardian.      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8) (Z72.89)     The patient's household members: [employer]. Has resided at  since 2013, in the [facility].   Employment History: workshop.         Guardian: Whitley Perdomo=[email or web address].   Living Situation: [facility] through [facility] services.   Relationship Status: single.   Legal History: none.    history: none.      Allergies  Medication    · No Known Drug Allergies   Recorded By: Arcelia Galvan; 3/23/2018 10:43:23 AM   Scores and Scales        Facial and Oral Movements:   Muscles of Facial Expression eg. movements of forehead, eyebrows, periorbital area, cheeks; include frowning, blinking, smiling, grimacing: None normal (0).   Lips and Perioral Area eg. puckering, pouting, smacking: None normal (0).   Jaw eg. biting, clenching, chewing, mouth opening, lateral movement: None normal (0).   Tongue. Rate only increases in movement both in and out of mouth, NOT inability to sustain movement None normal (0).   Extremity Movements:   Upper (arms, wrists, hands, fingers). Include chronic movements ( ie, rapid, objectively purposeless, irregular,  spontaneous); athetoid movements (ie, slow, irregular, complex, serpentine). DO NOT include tremor (ie, repetitive, regular, rythmic): None normal (0).   Lower (legs, knees, ankles, toes) eg, lateral knee movement, foot tapping, heel dropping, foot squirming, inversion and eversion of foot: None normal (0).   Trunk Movements:   Neck, shoulders, hips. eg, rocking, twisting, squirming, pelvic gyrations: None normal (0).      Overall Severity:   Severity of abnormal movements: None normal (0).   Incapacitation due to abnormal movements: None normal (0).   Patient's awareness of abnormal movements (rate only patient's report): No awareness (0).   Dental Status:   Current porblems with teeth and/or dentures: No.   Does patient usually wear dentures: No.      5/18/2023 clients is deaf and mute. Therefore, not able to complete this self questionnaire. However, caregiver of 16+ years reports no overt signs of depression.         PHQ-9 Depression Scale:   Total Score: 0/27

## 2024-06-04 ENCOUNTER — APPOINTMENT (OUTPATIENT)
Dept: BEHAVIORAL HEALTH | Facility: CLINIC | Age: 59
End: 2024-06-04
Payer: MEDICARE

## 2024-08-19 ENCOUNTER — APPOINTMENT (OUTPATIENT)
Dept: BEHAVIORAL HEALTH | Facility: CLINIC | Age: 59
End: 2024-08-19
Payer: MEDICARE

## 2024-08-19 VITALS
SYSTOLIC BLOOD PRESSURE: 112 MMHG | WEIGHT: 172 LBS | HEART RATE: 68 BPM | HEIGHT: 60 IN | DIASTOLIC BLOOD PRESSURE: 74 MMHG | BODY MASS INDEX: 33.77 KG/M2

## 2024-08-19 DIAGNOSIS — G47.9 SLEEP DISTURBANCES: ICD-10-CM

## 2024-08-19 DIAGNOSIS — F42.2 MIXED OBSESSIONAL THOUGHTS AND ACTS: Primary | ICD-10-CM

## 2024-08-19 PROCEDURE — 3008F BODY MASS INDEX DOCD: CPT | Performed by: NURSE PRACTITIONER

## 2024-08-19 PROCEDURE — 1036F TOBACCO NON-USER: CPT | Performed by: NURSE PRACTITIONER

## 2024-08-19 PROCEDURE — 99214 OFFICE O/P EST MOD 30 MIN: CPT | Performed by: NURSE PRACTITIONER

## 2024-08-19 RX ORDER — FLUOXETINE HYDROCHLORIDE 40 MG/1
80 CAPSULE ORAL DAILY
Qty: 62 CAPSULE | Refills: 5 | Status: SHIPPED | OUTPATIENT
Start: 2024-08-19

## 2024-08-19 RX ORDER — ACETAMINOPHEN 500 MG
5 TABLET ORAL NIGHTLY
Qty: 31 TABLET | Refills: 5 | Status: SHIPPED | OUTPATIENT
Start: 2024-08-19

## 2024-08-19 NOTE — PATIENT INSTRUCTIONS
ASSESSMENT: Ms. Santiago presents unchanged in invasive OCD behaviors with the increase in fluoxetine.   See treatment plan below.      PLAN:                                                                                                                         problems treated   f/u requested to prevent relapse   medications renewed/re-ordered     1. Increase Fluoxetine (Prozac) 80 mg (from 40 mg) by mouth daily for OCD.   2.  Increase Melatonin take 5 mg (from 3 mg) by mouth at bedtime for sleep.   3. Risks, benefits, alternatives, off-label uses, and side effects of medications have been discussed with patient/caregiver. There is no report of signs/symptoms consistent with medication-induced impairment in daily functioning. At this time, benefits of medication felt to outweigh potential risks. Will continue to reassess need for psychotropic medication at regular 3 to 6 month intervals.   Guardian, Whitley Martinez   4. Return To Clinic  Monday 12/9/24 at 8 AM virtual or earlier if needed. Call [phone] to reschedule.  Thank you for seeing me today.  If you have any questions or concerns, do not hesitate to contact my office.  Arcelia Maldonado     TREATMENT TYPE                                                                                                  counseling and coordination of care addressing signs and symptoms of illness; risks/benefits and side effects of medications; and behavioral approaches to illness.  This note was created using electronic dictation. There may be errors in syntax and meaning. Please contact the office with any questions.   For [facility] residents, [employer] is a 24/7 hotline you can call for assistance [[phone]].   Please call 911 or go to your closest Emergency Room if you feel worse. This includes thoughts of hurting yourself or anyone else, or having other troubles such as hearing voices, seeing visions, or having new and scary thoughts about the people  around you.

## 2024-08-19 NOTE — PROGRESS NOTES
ASSESSMENT: Ms. Santiago presents unchanged in invasive OCD behaviors with the increase in fluoxetine.   See treatment plan below.      PLAN:                                                                                                                         problems treated   f/u requested to prevent relapse   medications renewed/re-ordered     1. Increase Fluoxetine (Prozac) 80 mg (from 40 mg) by mouth daily for OCD.   2.  Increase Melatonin take 5 mg (from 3 mg) by mouth at bedtime for sleep.  Chart review shows she was on Ambien in 2010.  3. Risks, benefits, alternatives, off-label uses, and side effects of medications have been discussed with patient/caregiver. There is no report of signs/symptoms consistent with medication-induced impairment in daily functioning. At this time, benefits of medication felt to outweigh potential risks. Will continue to reassess need for psychotropic medication at regular 3 to 6 month intervals.   Guardian, Whitley Martinez   4. Return To Clinic  Monday 12/9/24 at 8 AM virtual or earlier if needed. Call [phone] to reschedule.  Thank you for seeing me today.  If you have any questions or concerns, do not hesitate to contact my office.  Arcelia Maldonado     TREATMENT TYPE                                                                                                  counseling and coordination of care addressing signs and symptoms of illness; risks/benefits and side effects of medications; and behavioral approaches to illness.  This note was created using electronic dictation. There may be errors in syntax and meaning. Please contact the office with any questions.   For [facility] residents, [address] [employer] is a 24/7 hotline you can call for assistance [[phone]].   Please call 911 or go to your closest Emergency Room if you feel worse. This includes thoughts of hurting yourself or anyone else, or having other troubles such as hearing voices, seeing visions, or having  new and scary thoughts about the people around you.      Provider Impressions     PRESENT FOR APPOINTMENT  Client  Caregiver:    Christina Dash, known since Christina has been 5 (16-17 years)     [facility] moved to November 2020 dt open bed. Previously at [facility].     SUBJECTIVE: 57 yo Malaysian American SF with a history of bipolar disorder, OCD, deaf, nonverbal, and moderate intellectual disability presenting for medication management.   Client not able to use correct American Sign Language. Therefore, not able to utilize  with the [employer] system.      Last seen May 2024. At that time, fluoxetine was increased for OCD.  March 2023. At that time, Equetro (Carbamazepine XR)was Dc'd.  December 2023.  At that time, Equetro was decreased due to low white blood cell count.    September 2023.  At that time, no medication changes.  May 2023. At that time, melatonin and fluoxetine were increased due to increased OCD.   November 2022. At that time, no medication changes. In interim, see 2/13/23 chart update, hydroxyzine PRN started for [place] trip. DC'd upon return.  May 31, 2022. At that time, no medication changes.  May 9, 2022. At that time, Melatonin was started.  October 2021. At that time, no medication changes.  September 2021. At that time, Equetro (Carbamazepine XR) was decreased.   March 2021. At that time, no medication changes.  December 2020. At that time, no medication changes.  May 2020. At that time, no medication changes.  February 2020. At that time, no medication changes.   August 2019. At that time, no medication changes.  November 2018. At that time, no medication changes.  August 2018. Lorazepam PRN DC'd dt not approved by [employer].  May 2018. At that time, Lorazepam PRN started for appts. Has not used in interim.  March 2018 for initial evaluation. At that time, no medication changes.      Info collected by caregiver, as ct is deaf and nonverbal. Debbie stands and Waves.  She uses  "American sign language to this clinician.  No changes reported and OCD behaviors.  Caregiver reports does not appear to be different than when she was on Equetro.  Although, it does appear to have increased over the last year.  Chart review showed that in 2010 she was on Ambien for sleep.  Still some inappropriate gestures sexually in public.  Ritualistic stepping.  Will wake up in the night for short.  The time, but able to go back to sleep.  Naps some in the day.   increased touching and smelling items. Taking stairs and kicking each step  9/15/23 Endometrial polyp removed at [facility].    Strip her clothes, will open the door and screen. She will look back to see if staff are going to stop her. Usually if staff ignore her while she walks naked down the stairs, she will go put her clothes back on.  Attempts to hit certain peer.      Pulm  Appetite: DASH diet. Low sodium. No PICA reported. Discussed ct on Low Sodium diet, but use of Equetro has possible SE of hyponatremia. Levels remain WNL  She gets \"giddy\", excited to go out in public, like shopping. Staff report while they are out, she is constantly touching their sides.  When she is unhappy or bored: strips off her clothes, wears her clothes inside out, shoes on the wrong feet, acts like she is attempting to AWOL, but will keep looking over her shoulder to see if staff are coming.    Continues to participate.      Some laughing to herself and placing her hands in her pants to smell them. Caregiver reports this has always been.      Wears identifying info on bracelet that she wears when in the community.  Coping: coloring, walks, car rides  Psy/SI/HI/aggression  May 21, 2018, Debbie engaged in self-injurious behavior scratching herself for unknown reasons. None since. She has had some stripping at the workshop. She will then go in the bathroom and start washing with a damp cloth.   Reports from workshop stated that Debbie does not communicate with staff, but Feb " "2018 was using sign language indicating sex.  May touch/rub others' stomach and start laughing. New in the year 2017.  Hx of Aggression: Physical towards female peer or staff: has picked up items to use to hit peer.   Appears short lasting, no more than 30 minutes: if peer looks injured (holding her head) ct walks over and holds her arm out for peer to hit her back.  Will \"look off at something and start laughing.\" No seizure hx. Episodes last a few seconds and occur multiple times a day. Staff report that she likes to look out the window and will see a bird or something. Staff reports that it does not appear to be from internal stimulation.  No hysterical laughing noted when alone. No crying without cause  Collects: Kleenex and paper towels: uses and keeps on her dresser. Caregiver will not discard, but holds a trash can and allow client to throw away. Does not like to get rid of old clothes. She likes to be asked and will give thumbs up to discard. Staff report her room is \"spotless\".   OCD: Debbie will take several steps forward, then a couple steps backwards. She will follow this pattern even on steps. May be mildly bothered by interruption in routine, but does not interfere with the rest of her day.  No MH hospitalizations.        Prior to COVID: Daily schedule: [employer] [facility] M-F 9-3:30.   Self-sufficient in all ADL's likes to look \"pretty.\" Wears necklaces, bracelets & matching hair bows.  Med Physicians:  PCP: Dr Sheriff. Has routine appt. no medication changes.   Podiatrist: Dr Hailey Dickinson. Routine appts.  Dietician: [facility]: Deborah Ortega. Attends Q 3-6 months.   GYN: Cari Bee NP approx Sept 2017.  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  Menopausal-   ALLERGIES: NKDA     Review of symptoms reveals  CVS: no lower limb swelling and no weight gain  she does have intermittent chest pain often after she eats spicy food and nthis resolves after ginger ale  no " "PND or orthopnea  Resp: no breathlessness  GI: nil change on bowel or bladder function    ACTIVE PROBLEM LIST  Rubella Exposure  S/P Pda Repair  Cerebral Palsy  Speech Defect  Intellectual Disability  Microcephaly  Hearing Difficulty       Med SE: None noted or reported    COMPLIANCE: good     MMS:  ORIENTATION   alert  ambulatory  cooperative   dysmorphic features     BEHAVIOR:  enters easily  eye contact fair   gait normal  reciprocal interaction  non verbal     MEMORY:  unable to assess     SENSORY :  glasses  Deaf     COMMUNICATION:  No ASL     AFFECT:  normal/full     MOOD: When asked how she feels, rotated her finger in circles on the \"Feelings Chart\"      THOUGHT PROCESS:  concrete   perseverative      THOUGHT Content:  obsessive     CONCENTRATION  normal     FUND OF KNOWLEDGE :  moderate disability     JUDGEMENT: posed situations     EPS None noted or reported  AIMS negative N/A     LABS REVIEWED:  2023 in cart  2022 in chart  Carbamazepine 1.5 ug/mL (0.8-2.4). While on 100/200 mg  2021: reviewed:  White Blood Cell Count 3.7 x10E9/L L 4.4 - 11.3      EKG none available      Chief Complaint     An interactive audio and video telecommunication system which permits real time communications between the patient (at the originating site) and provider (at the distant site) was utilized to provide this telehealth service.    Verbal consent was requested and obtained from ADIS LUCIO on this date for a telehealth visit.   Evaluation of medication for bipolar disorder, OCD        History of Present IllnessFamily history: - Psychiatric diagnosis: denies primary relatives with serious mental illness (SA) and/or substance use disorders  Parents: both are , prior to ct residing at   Siblings: 3 sisters & 4 brothers, none are in contact with ct  Personal history: unknown  Birth: unknown  Development: unknown  School: unknown  Occupations: [employer]  Sexual: " unknown  Abuse: unknown  Orientation: unknown  Marriage/partner: none  Habits: likes routine  Alcohol: none  Tobacco: none  Drugs: none  Prescribed: none  Recreational: LEAP, Softball, line dancing, Volleyball,      Past psychiatric  Previously seen at [facility] from 2013 until 3/23/18.   Had lived with her parents until they passed. She had a short stay in respite until she was connected with provider [facility]. Has resided at  since 2013, in the [facility]. Moved into [facility] November 2020. Services still through [facility]. Resides with 3 female peers, ct has her own room.      There are no spiritual/cultural practices/values/needs that are important to know   Initial Fall Risk Screening:   ADIS has not fallen in the last 6 months. The patient is not using an assistive device.    Tobacco Screening: ADIS does not use tobacco.   Blood Pressure monitoring   Blood Pressure Uncontrolled, Systolic >= 140mm Hg   Blood Pressure Controlled, Diastolic 80-89mm Hg   Domestic Violence Screen: Does not feel threatened or abused physically, emotionally or sexually. Do you feel UNSAFE?   The patient feels safe in the home.   Depression/Suicide Screening:   During the past 2 weeks, the patient has not felt down, depressed or hopeless.    During the past 2 weeks, the patient has not felt little interest or pleasure in doing things.    She does not have a risk of suicide.    She has not had thoughts of harming others.    Single alcohol screening question: Patient Declined/Screening not indicated.   Single substance abuse screening question: Patient Declined/Screening not indicated.   Procedure or Sedation Areas:   patient has not had alcohol, recreational drugs, or prescription drugs for non-medical reasons this morning.   Nutrition Screening:   In the past month, there was not a day when I or anyone in my family went hungry because there was not enough food.   Patient Education: The patient or the person  with them need(s) extra help because of problems with: learning speaking   The patient is not comfortable filling out medical forms.   Key Learner(s) are identified by the patient as person(s) most likely to participate in providing care, such as managing medications or taking them to doctors' appointments. Relation: Legal Guardian.      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8) (Z72.89)     The patient's [facility]. Has resided at  since 2013, in the [facility].   Employment History: workshop.         Guardian: Whitley Perdomo=APSI.   Living Situation: [facility] 2013 through [facility] services.   Relationship Status: single.   Legal History: none.    history: none.      5/18/2023 clients is deaf and mute. Therefore, not able to complete this self questionnaire. However, caregiver of 16+ years reports no overt signs of depression.         PHQ-9 Depression Scale:   Total Score: 0/27

## 2024-12-09 ENCOUNTER — APPOINTMENT (OUTPATIENT)
Dept: BEHAVIORAL HEALTH | Facility: CLINIC | Age: 59
End: 2024-12-09
Payer: MEDICARE

## 2024-12-18 ENCOUNTER — APPOINTMENT (OUTPATIENT)
Dept: BEHAVIORAL HEALTH | Facility: CLINIC | Age: 59
End: 2024-12-18
Payer: MEDICARE

## 2025-01-22 DIAGNOSIS — F42.2 MIXED OBSESSIONAL THOUGHTS AND ACTS: ICD-10-CM

## 2025-01-22 DIAGNOSIS — G47.9 SLEEP DISTURBANCES: ICD-10-CM

## 2025-01-23 RX ORDER — FLUOXETINE HYDROCHLORIDE 40 MG/1
80 CAPSULE ORAL DAILY
Qty: 62 CAPSULE | Refills: 0 | Status: SHIPPED | OUTPATIENT
Start: 2025-01-23

## 2025-01-23 RX ORDER — ACETAMINOPHEN 500 MG
5 TABLET ORAL NIGHTLY
Qty: 31 TABLET | Refills: 0 | Status: SHIPPED | OUTPATIENT
Start: 2025-01-23

## 2025-02-10 ENCOUNTER — APPOINTMENT (OUTPATIENT)
Dept: BEHAVIORAL HEALTH | Facility: CLINIC | Age: 60
End: 2025-02-10
Payer: MEDICARE

## 2025-02-12 DIAGNOSIS — F42.2 MIXED OBSESSIONAL THOUGHTS AND ACTS: ICD-10-CM

## 2025-02-12 RX ORDER — FLUOXETINE HYDROCHLORIDE 40 MG/1
80 CAPSULE ORAL DAILY
Qty: 62 CAPSULE | Refills: 0 | Status: SHIPPED | OUTPATIENT
Start: 2025-02-12 | End: 2025-02-13 | Stop reason: SDUPTHER

## 2025-02-13 ENCOUNTER — OFFICE VISIT (OUTPATIENT)
Dept: BEHAVIORAL HEALTH | Facility: CLINIC | Age: 60
End: 2025-02-13
Payer: MEDICARE

## 2025-02-13 VITALS
WEIGHT: 163 LBS | BODY MASS INDEX: 32 KG/M2 | RESPIRATION RATE: 19 BRPM | HEART RATE: 81 BPM | HEIGHT: 60 IN | DIASTOLIC BLOOD PRESSURE: 68 MMHG | SYSTOLIC BLOOD PRESSURE: 117 MMHG

## 2025-02-13 DIAGNOSIS — F71 MODERATE INTELLECTUAL DISABILITY WITH INTELLIGENCE QUOTIENT 35 TO 49: ICD-10-CM

## 2025-02-13 DIAGNOSIS — G47.9 SLEEP DISTURBANCES: ICD-10-CM

## 2025-02-13 DIAGNOSIS — F42.2 MIXED OBSESSIONAL THOUGHTS AND ACTS: Primary | ICD-10-CM

## 2025-02-13 PROCEDURE — 1036F TOBACCO NON-USER: CPT | Performed by: NURSE PRACTITIONER

## 2025-02-13 PROCEDURE — 99215 OFFICE O/P EST HI 40 MIN: CPT | Performed by: NURSE PRACTITIONER

## 2025-02-13 PROCEDURE — 3008F BODY MASS INDEX DOCD: CPT | Performed by: NURSE PRACTITIONER

## 2025-02-13 RX ORDER — ACETAMINOPHEN 500 MG
5 TABLET ORAL NIGHTLY
Qty: 31 TABLET | Refills: 5 | Status: SHIPPED | OUTPATIENT
Start: 2025-02-13

## 2025-02-13 RX ORDER — FLUOXETINE HYDROCHLORIDE 40 MG/1
80 CAPSULE ORAL DAILY
Qty: 62 CAPSULE | Refills: 5 | Status: SHIPPED | OUTPATIENT
Start: 2025-02-13

## 2025-02-13 NOTE — PROGRESS NOTES
ASSESSMENT: Ms. Santiago presents at baseline mental health wise.  No new health issues.  Consider fasting labs next appointment.  Discussed chemical imbalance versus behavioral.  No changes in symptoms on/off nor higher/lower doses.  See treatment plan below.      PLAN:                                                                                                                         problems treated   f/u requested to prevent relapse   medications renewed/re-ordered     1. Continue Fluoxetine (Prozac) 80 mg by mouth daily for OCD.   2. Continue Melatonin take 5 mg by mouth at bedtime for sleep.    3. Risks, benefits, alternatives, off-label uses, and side effects of medications have been discussed with patient/caregiver. There is no report of signs/symptoms consistent with medication-induced impairment in daily functioning. At this time, benefits of medication felt to outweigh potential risks. Will continue to reassess need for psychotropic medication at regular 3 to 6 month intervals.   Guardian, Whitley Martinez   4. Return To Clinic 6 months virtual or earlier if needed. Call [phone] to reschedule.  Thank you for seeing me today.  If you have any questions or concerns, do not hesitate to contact my office.  Arcelia Maldonado  Total time: 41 minutes  TREATMENT TYPE                                                                                                  counseling and coordination of care addressing signs and symptoms of illness; risks/benefits and side effects of medications; and behavioral approaches to illness.  This note was created using electronic dictation. There may be errors in syntax and meaning. Please contact the office with any questions.   For [facility] residents, [employer] is a 24/7 hotline you can call for assistance [[phone]].   Please call 911 or go to your closest Emergency Room if you feel worse. This includes thoughts of hurting yourself or anyone else, or having  other troubles such as hearing voices, seeing visions, or having new and scary thoughts about the people around you.      Provider Impressions     PRESENT FOR APPOINTMENT  Client  Caregiver:    Christina Dash, known since Christina has been 6 (16-17 years)     [facility] moved to November 2020 dt open bed. Previously at [facility].     SUBJECTIVE: 58 yo [place] American SF with a history of bipolar disorder, OCD, deaf, nonverbal, and moderate intellectual disability presenting for medication management.   Client not able to use correct American Sign Language. Therefore, not able to utilize  with the [employer] system.      Last seen August 2024.  At that time, fluoxetine and melatonin were increased.  May 2024. At that time, fluoxetine was increased for OCD.  March 2023. At that time, Equetro (Carbamazepine XR)was Dc'd.  December 2023.  At that time, Equetro was decreased due to low white blood cell count.    September 2023.  At that time, no medication changes.  May 2023. At that time, melatonin and fluoxetine were increased due to increased OCD.   November 2022. At that time, no medication changes. In interim, see 2/13/23 chart update, hydroxyzine PRN started for [place] trip. DC'd upon return.  May 31, 2022. At that time, no medication changes.  May 9, 2022. At that time, Melatonin was started.  October 2021. At that time, no medication changes.  September 2021. At that time, Equetro (Carbamazepine XR) was decreased.   March 2021. At that time, no medication changes.  December 2020. At that time, no medication changes.  May 2020. At that time, no medication changes.  February 2020. At that time, no medication changes.   August 2019. At that time, no medication changes.  November 2018. At that time, no medication changes.  August 2018. Lorazepam PRN DC'd dt not approved by C.  May 2018. At that time, Lorazepam PRN started for appts. Has not used in interim.  March 2018 for initial evaluation. At  "that time, no medication changes.      Info collected by caregiver, as ct is deaf and nonverbal. She uses American sign language to this clinician.  Debbie stands and walks over to the camera and caregiver to show that she is wearing her shoes on different feet.  Caregiver reports that she will slowly start doing things that she knows how to typically do in order to receive attention.  As caregiver predicted, when issues did not elicit a response, she returned with her winter coat on backwards.  Caregiver replied that is not the end, she will come back again.  Which, she did with her hands in the back of her pants.  Caregiver replied if they let her continue, the next step would be for her to return without clothing.  Caregiver that has known her upwards of 20 years, reports no changes on or off meds.  Although may be slightly better with no longer taking Equetro (carbamazepine ER).  Chart review showed that in 2010 she was on Ambien for sleep.  However, no improvements in sleep reported at that time.  OCD history: Inappropriate gestures sexually in public.  Ritualistic stepping, Taking stairs and kicking each step.  touching and smelling items, including hands in the back of her pants.  Strip her clothes, will open the door and screen. She will look back to see if staff are going to stop her. Usually if staff ignore her while she walks naked down the stairs, she will go put her clothes back on.  She will raise her fist caregivers, but no attempts to actually hit.  However, she will attend to hit a certain peer.  No known trigger.  Happens rarely.  Sleep: Will wake up in the night for short time, but able to go back to sleep. Naps some in the day when there are no activities.   January 2024 [facility] at [facility].  No note in epic.  9/15/23 Endometrial polyp removed at [facility].    Pulm  Appetite: DASH diet. Low sodium. No PICA reported.   She gets \"giddy\", excited to go out in public, like shopping. Staff " "report while they are out, she is constantly touching their sides.   Continues to participate.      Wears identifying info on bracelet that she wears when in the community.  Coping: coloring, walks, car rides  Psy/SI/HI/aggression  May 21, 2018, Debbie engaged in self-injurious behavior scratching herself for unknown reasons. None since. She has had some stripping at the workshop. She will then go in the bathroom and start washing with a damp cloth.   Reports from workshop stated that Debbie does not communicate with staff, but Feb 2018 was using sign language indicating sex.  May touch/rub others' stomach and start laughing. New in the year 2017.  Hx of Aggression: Physical towards female peer or staff: has picked up items to use to hit peer.   Appears short lasting, no more than 30 minutes: if peer looks injured (holding her head) ct walks over and holds her arm out for peer to hit her back.  Will \"look off at something and start laughing.\" No seizure hx. Episodes last a few seconds and occur multiple times a day. Staff report that she likes to look out the window and will see a bird or something. Staff reports that it does not appear to be from internal stimulation.  No hysterical laughing noted when alone. No crying without cause  Collects: Kleenex and paper towels: uses and keeps on her dresser. Caregiver will not discard, but holds a trash can and allow client to throw away. Does not like to get rid of old clothes. She likes to be asked and will give thumbs up to discard. Staff report her room is \"spotless\".   OCD: Debbie will take several steps forward, then a couple steps backwards. She will follow this pattern even on steps. May be mildly bothered by interruption in routine, but does not interfere with the rest of her day.  No MH hospitalizations.        Prior to COVID: Daily schedule: [employer] [facility] [facility] [employer] [place] M-F 9-3:30.   Self-sufficient in all ADL's likes to look \"pretty.\" Wears necklaces, " "bracelets & matching hair bows.  Med Physicians:  PCP: Dr Sheriff. Has routine appt. no medication changes.   Podiatrist: Dr Hailey Dickinson. Routine appts.  Dietician: [facility]: Deborah Ortega. Attends Q 3-6 months.   GYN: Cari Bee NP approx Sept 2017.  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  Menopausal-   ALLERGIES: NKDA     Review of symptoms reveals  CVS: no lower limb swelling and no weight gain  she does have intermittent chest pain often after she eats spicy food and nthis resolves after ginger ale  no PND or orthopnea  Resp: no breathlessness  GI: nil change on bowel or bladder function    ACTIVE PROBLEM LIST  Rubella Exposure  S/P Pda Repair  Cerebral Palsy  Speech Defect  Intellectual Disability  Microcephaly  Hearing Difficulty       Med SE: None noted or reported    COMPLIANCE: good     MMS:  ORIENTATION   alert  ambulatory  cooperative   dysmorphic features     BEHAVIOR:  enters easily  eye contact fair   gait normal  reciprocal interaction  non verbal     MEMORY:  unable to assess     SENSORY :  glasses  Deaf     COMMUNICATION:  No ASL     AFFECT:  normal/full     MOOD: When asked how she feels, rotated her finger in circles on the \"Feelings Chart\"      THOUGHT PROCESS:  concrete   perseverative      THOUGHT Content:  obsessive     CONCENTRATION  normal     FUND OF KNOWLEDGE :  moderate disability     JUDGEMENT: posed situations     EPS None noted or reported  AIMS negative N/A     LABS REVIEWED:  August 2023 in cart  June 2022 in chart  Carbamazepine 1.5 ug/mL (0.8-2.4). While on 100/200 mg  Feb 2021: reviewed:  White Blood Cell Count 3.7 x10E9/L L 4.4 - 11.3      EKG none available      Chief Complaint     An interactive audio and video telecommunication system which permits real time communications between the patient (at the originating site) and provider (at the distant site) was utilized to provide this telehealth service.    Verbal consent was requested and obtained from " ADIS BOLAÑOSDO on this date for a telehealth visit.   Evaluation of medication for bipolar disorder, OCD        History of Present IllnessFamily history: - Psychiatric diagnosis: denies primary relatives with serious mental illness (SA) and/or substance use disorders  Parents: both are , prior to ct residing at   Siblings: 3 sisters & 4 brothers, none are in contact with ct  Personal history: unknown  Birth: unknown  Development: unknown  School: unknown  Occupations: [employer] [place]  Sexual: unknown  Abuse: unknown  Orientation: unknown  Marriage/partner: none  Habits: likes routine  Alcohol: none  Tobacco: none  Drugs: none  Prescribed: none  Recreational: LEAP, Softball, line dancing, Volleyball,      Past psychiatric  Previously seen at [facility] from  until 3/23/18.   Had lived with her parents until they passed. She had a short stay in respite until she was connected with provider [facility]. Has resided at  since , in the [facility]. Moved into [facility] 2020. Services still through [facility]. Resides with 3 female peers, ct has her own room.      There are no spiritual/cultural practices/values/needs that are important to know   Initial Fall Risk Screening:   ADIS has not fallen in the last 6 months. The patient is not using an assistive device.    Tobacco Screening: ADIS does not use tobacco.   Domestic Violence Screen: Does not feel threatened or abused physically, emotionally or sexually. Do you feel UNSAFE?   The patient feels safe in the home.   Depression/Suicide Screening:   During the past 2 weeks, the patient has not felt down, depressed or hopeless.    During the past 2 weeks, the patient has not felt little interest or pleasure in doing things.    She does not have a risk of suicide.    She has not had thoughts of harming others.    Single alcohol screening question: Patient Declined/Screening not indicated.   Single substance  abuse screening question: Patient Declined/Screening not indicated.   Procedure or Sedation Areas:   patient has not had alcohol, recreational drugs, or prescription drugs for non-medical reasons this morning.   Nutrition Screening:   In the past month, there was not a day when I or anyone in my family went hungry because there was not enough food.   Patient Education: The patient or the person with them need(s) extra help because of problems with: learning speaking   The patient is not comfortable filling out medical forms.   Key Learner(s) are identified by the patient as person(s) most likely to participate in providing care, such as managing medications or taking them to doctors' appointments. Relation: Legal Guardian.      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8) (Z72.89)     The patient's household members: [employer]. Has resided at  since 2013, in the [facility].   Employment History: workshop.         Guardian: Whitley Perdomo=[place].   Living Situation: [facility] 2013 through [facility] services.   Relationship Status: single.   Legal History: none.    history: none.      5/18/2023 clients is deaf and mute. Therefore, not able to complete this self questionnaire. However, caregiver of 16+ years reports  no overt signs of depression.

## 2025-02-13 NOTE — PATIENT INSTRUCTIONS
ASSESSMENT: Ms. Santiago presents at baseline mental health wise.  See treatment plan below.      PLAN:                                                                                                                         problems treated   f/u requested to prevent relapse   medications renewed/re-ordered     1. Continue Fluoxetine (Prozac) 80 mg by mouth daily for OCD.   2. Continue Melatonin take 5 mg by mouth at bedtime for sleep.    3. Risks, benefits, alternatives, off-label uses, and side effects of medications have been discussed with patient/caregiver. There is no report of signs/symptoms consistent with medication-induced impairment in daily functioning. At this time, benefits of medication felt to outweigh potential risks. Will continue to reassess need for psychotropic medication at regular 3 to 6 month intervals.   Guardian, Whitley Martinez   4. Return To Clinic 6 months virtual or earlier if needed. Call [phone] to reschedule.  Thank you for seeing me today.  If you have any questions or concerns, do not hesitate to contact my office.  Arcelia Maldonado     TREATMENT TYPE                                                                                                  counseling and coordination of care addressing signs and symptoms of illness; risks/benefits and side effects of medications; and behavioral approaches to illness.  This note was created using electronic dictation. There may be errors in syntax and meaning. Please contact the office with any questions.

## 2025-07-29 DIAGNOSIS — F42.2 MIXED OBSESSIONAL THOUGHTS AND ACTS: ICD-10-CM

## 2025-07-30 RX ORDER — FLUOXETINE HYDROCHLORIDE 40 MG/1
CAPSULE ORAL
Qty: 54 CAPSULE | Refills: 10 | OUTPATIENT
Start: 2025-07-30

## 2025-08-13 ENCOUNTER — APPOINTMENT (OUTPATIENT)
Dept: BEHAVIORAL HEALTH | Facility: CLINIC | Age: 60
End: 2025-08-13
Payer: COMMERCIAL

## 2025-08-13 VITALS
HEART RATE: 70 BPM | SYSTOLIC BLOOD PRESSURE: 123 MMHG | RESPIRATION RATE: 17 BRPM | WEIGHT: 158 LBS | DIASTOLIC BLOOD PRESSURE: 74 MMHG | BODY MASS INDEX: 30.86 KG/M2

## 2025-08-13 DIAGNOSIS — F42.2 MIXED OBSESSIONAL THOUGHTS AND ACTS: Primary | ICD-10-CM

## 2025-08-13 DIAGNOSIS — F71 MODERATE INTELLECTUAL DISABILITY WITH INTELLIGENCE QUOTIENT 35 TO 49: ICD-10-CM

## 2025-08-13 DIAGNOSIS — G47.9 SLEEP DISTURBANCES: ICD-10-CM

## 2025-08-13 DIAGNOSIS — G80.9 CEREBRAL PALSY, UNSPECIFIED TYPE (MULTI): ICD-10-CM

## 2025-08-13 DIAGNOSIS — Z79.899 ENCOUNTER FOR LONG-TERM (CURRENT) USE OF HIGH-RISK MEDICATION: ICD-10-CM

## 2025-08-13 PROCEDURE — 99213 OFFICE O/P EST LOW 20 MIN: CPT | Performed by: NURSE PRACTITIONER

## 2025-08-13 PROCEDURE — 1036F TOBACCO NON-USER: CPT | Performed by: NURSE PRACTITIONER

## 2025-08-13 PROCEDURE — G2211 COMPLEX E/M VISIT ADD ON: HCPCS | Performed by: NURSE PRACTITIONER

## 2025-08-13 RX ORDER — LISINOPRIL 20 MG/1
20 TABLET ORAL
COMMUNITY
Start: 2018-03-20 | End: 2025-12-04

## 2025-08-13 RX ORDER — FLUOXETINE HYDROCHLORIDE 40 MG/1
80 CAPSULE ORAL DAILY
Qty: 62 CAPSULE | Refills: 5 | Status: SHIPPED | OUTPATIENT
Start: 2025-08-13

## 2025-08-13 RX ORDER — ACETAMINOPHEN 500 MG
5 TABLET ORAL NIGHTLY
Qty: 31 TABLET | Refills: 5 | Status: SHIPPED | OUTPATIENT
Start: 2025-08-13

## 2025-08-13 RX ORDER — VIT C/E/ZN/COPPR/LUTEIN/ZEAXAN 250MG-90MG
CAPSULE ORAL
COMMUNITY
Start: 2024-11-20

## 2025-08-13 RX ORDER — ATORVASTATIN CALCIUM 40 MG/1
40 TABLET, FILM COATED ORAL NIGHTLY
COMMUNITY
Start: 2021-08-31

## 2025-08-13 RX ORDER — FLUTICASONE PROPIONATE 50 MCG
1 SPRAY, SUSPENSION (ML) NASAL
COMMUNITY
Start: 2025-07-14

## 2025-08-13 ASSESSMENT — PATIENT HEALTH QUESTIONNAIRE - PHQ9
3. TROUBLE FALLING OR STAYING ASLEEP OR SLEEPING TOO MUCH: NOT AT ALL
5. POOR APPETITE OR OVEREATING: NOT AT ALL
7. TROUBLE CONCENTRATING ON THINGS, SUCH AS READING THE NEWSPAPER OR WATCHING TELEVISION: NOT AT ALL
4. FEELING TIRED OR HAVING LITTLE ENERGY: NOT AT ALL
2. FEELING DOWN, DEPRESSED OR HOPELESS: NOT AT ALL
9. THOUGHTS THAT YOU WOULD BE BETTER OFF DEAD, OR OF HURTING YOURSELF: NOT AT ALL
6. FEELING BAD ABOUT YOURSELF - OR THAT YOU ARE A FAILURE OR HAVE LET YOURSELF OR YOUR FAMILY DOWN: NOT AT ALL
1. LITTLE INTEREST OR PLEASURE IN DOING THINGS: NOT AT ALL
8. MOVING OR SPEAKING SO SLOWLY THAT OTHER PEOPLE COULD HAVE NOTICED. OR THE OPPOSITE, BEING SO FIGETY OR RESTLESS THAT YOU HAVE BEEN MOVING AROUND A LOT MORE THAN USUAL: NOT AT ALL

## 2026-02-02 ENCOUNTER — APPOINTMENT (OUTPATIENT)
Dept: BEHAVIORAL HEALTH | Facility: CLINIC | Age: 61
End: 2026-02-02
Payer: COMMERCIAL